# Patient Record
(demographics unavailable — no encounter records)

---

## 2025-03-18 NOTE — HISTORY OF PRESENT ILLNESS
[de-identified] : No nausea, vomiting, diarrhea, and constipation. No chest pain or shortness of breath.  Gym. 2-4 week.  Weights.  No cardio.  Slight increase in weight.  Protein, carbs, vegetables. Not having am erections.  Has been losing erection with vaginal intercourse. Had a thumping heart episode after vaping nicotine.

## 2025-03-18 NOTE — HEALTH RISK ASSESSMENT
[Very Good] : ~his/her~  mood as very good [Yes] : Yes [2 - 4 times a month (2 pts)] : 2-4 times a month (2 points) [1 or 2 (0 pts)] : 1 or 2 (0 points) [Never (0 pts)] : Never (0 points) [0] : 2) Feeling down, depressed, or hopeless: Not at all (0) [PHQ-2 Negative - No further assessment needed] : PHQ-2 Negative - No further assessment needed [Never] : Never [With Family] : lives with family [Employed] : employed [College] : College [Single] : single [[id number]] : 0 [de-identified] : .  Hever